# Patient Record
Sex: MALE | Race: WHITE | Employment: FULL TIME | URBAN - METROPOLITAN AREA
[De-identification: names, ages, dates, MRNs, and addresses within clinical notes are randomized per-mention and may not be internally consistent; named-entity substitution may affect disease eponyms.]

---

## 2017-12-11 PROBLEM — K35.80 ACUTE APPENDICITIS: Status: ACTIVE | Noted: 2017-12-11

## 2018-02-14 ENCOUNTER — OFFICE VISIT (OUTPATIENT)
Dept: ORTHOPEDIC SURGERY | Age: 38
End: 2018-02-14

## 2018-02-14 VITALS — WEIGHT: 260 LBS | BODY MASS INDEX: 33.37 KG/M2 | HEIGHT: 74 IN

## 2018-02-14 DIAGNOSIS — S43.52XA SPRAIN OF LEFT ACROMIOCLAVICULAR LIGAMENT, INITIAL ENCOUNTER: ICD-10-CM

## 2018-02-14 DIAGNOSIS — M25.512 LEFT SHOULDER PAIN, UNSPECIFIED CHRONICITY: Primary | ICD-10-CM

## 2018-02-14 PROCEDURE — 99203 OFFICE O/P NEW LOW 30 MIN: CPT | Performed by: ORTHOPAEDIC SURGERY

## 2018-02-14 RX ORDER — METHYLPREDNISOLONE 4 MG/1
TABLET ORAL
Qty: 1 KIT | Refills: 0 | Status: SHIPPED | OUTPATIENT
Start: 2018-02-14 | End: 2018-09-20 | Stop reason: ALTCHOICE

## 2018-02-14 NOTE — PROGRESS NOTES
Examination of the right lower extremity does not show any tenderness, deformity or injury. Range of motion is unremarkable. There is no gross instability. There are no rashes, ulcerations or lesions. Strength and tone are normal.    Radiology:     X-rays obtained and reviewed in office:  Views 4 views left shoulder demonstrates no evidence of fracture or dislocation other osseous abnormalities    Assessment :  Left shoulder a.c. joint sprain    Impression:  Encounter Diagnoses   Name Primary?  Left shoulder pain, unspecified chronicity Yes    Sprain of left acromioclavicular ligament, initial encounter        Office Procedures:  Orders Placed This Encounter   Procedures    XR SHOULDER LEFT (MIN 2 VIEWS)     [id number]     Order Specific Question:   Reason for exam:     Answer:   Pain       Treatment Plan:  I discussed diagnosis and prognosis with him today. Recommending at this time placement onto a Medrol Dosepak to reduce down his inflammation. Also recommend avoidance of any horizontal pressing activities which will exacerbate the a.c. joint.   If this is not improving over the next 4 weeks I will see him back in clinic

## 2018-06-13 ENCOUNTER — OFFICE VISIT (OUTPATIENT)
Dept: ORTHOPEDIC SURGERY | Age: 38
End: 2018-06-13

## 2018-06-13 VITALS — WEIGHT: 259.92 LBS | HEIGHT: 74 IN | BODY MASS INDEX: 33.36 KG/M2

## 2018-06-13 DIAGNOSIS — M19.019 AC JOINT ARTHROPATHY: Primary | ICD-10-CM

## 2018-06-13 PROCEDURE — 99213 OFFICE O/P EST LOW 20 MIN: CPT | Performed by: ORTHOPAEDIC SURGERY

## 2018-06-13 PROCEDURE — 20610 DRAIN/INJ JOINT/BURSA W/O US: CPT | Performed by: ORTHOPAEDIC SURGERY

## 2018-07-23 ENCOUNTER — OFFICE VISIT (OUTPATIENT)
Dept: ORTHOPEDIC SURGERY | Age: 38
End: 2018-07-23

## 2018-07-23 VITALS — WEIGHT: 259 LBS | BODY MASS INDEX: 33.24 KG/M2 | HEIGHT: 74 IN

## 2018-07-23 DIAGNOSIS — M19.019 AC JOINT ARTHROPATHY: Primary | ICD-10-CM

## 2018-07-23 PROCEDURE — 99213 OFFICE O/P EST LOW 20 MIN: CPT | Performed by: ORTHOPAEDIC SURGERY

## 2018-07-23 RX ORDER — NAPROXEN 500 MG/1
500 TABLET ORAL 2 TIMES DAILY WITH MEALS
Qty: 60 TABLET | Refills: 3 | Status: SHIPPED | OUTPATIENT
Start: 2018-07-23

## 2018-08-22 DIAGNOSIS — M25.512 LEFT SHOULDER PAIN, UNSPECIFIED CHRONICITY: Primary | ICD-10-CM

## 2018-08-23 ENCOUNTER — TELEPHONE (OUTPATIENT)
Dept: ORTHOPEDIC SURGERY | Age: 38
End: 2018-08-23

## 2018-09-04 ENCOUNTER — OFFICE VISIT (OUTPATIENT)
Dept: ORTHOPEDIC SURGERY | Age: 38
End: 2018-09-04

## 2018-09-04 VITALS — BODY MASS INDEX: 33.24 KG/M2 | HEIGHT: 74 IN | WEIGHT: 259 LBS

## 2018-09-04 DIAGNOSIS — M75.42 IMPINGEMENT SYNDROME OF LEFT SHOULDER: ICD-10-CM

## 2018-09-04 DIAGNOSIS — M19.019 AC JOINT ARTHROPATHY: Primary | ICD-10-CM

## 2018-09-04 PROCEDURE — 99213 OFFICE O/P EST LOW 20 MIN: CPT | Performed by: ORTHOPAEDIC SURGERY

## 2018-09-04 NOTE — PROGRESS NOTES
Chief Complaint    Results (MRI results)      History of Present Illness:  Claudette Jenkins is a 41 y.o. male. He is here for follow-up for his left shoulder. He does continue to have a lot of pain directly over his a.c. joint. We have treated him with intra-articular injection into the a.c. joint that has not given him any long-term improvement. He did have an MRI and is here today for the results. Medical History:  Patient's medications, allergies, past medical, surgical, social and family histories were reviewed and updated as appropriate. Review of Systems:  Pertinent items are noted in HPI  Review of systems reviewed from Patient History Form dated on 9/4/18 and available in the patient's chart under the Media tab. Vital Signs:  Ht 6' 2\" (1.88 m)   Wt 259 lb (117.5 kg)   BMI 33.25 kg/m²     General Exam:   Constitutional: Patient is adequately groomed with no evidence of malnutrition  DTRs: Deep tendon reflexes are intact  Mental Status: The patient is oriented to time, place and person. The patient's mood and affect are appropriate. Shoulder Examination:    Inspection:  No swelling erythema or ecchymosis noted about the left shoulder     Palpation:  no tenderness palpation today over the a.c. Joint. There is some crepitus over the a.c. Joint through shoulder range of motion     Range of Motion:  He does have full active range of motion of left shoulder.  There is some crepitus over the a.c. joint range of motion     Strength:   strength is intact with rotator cuff testing     Special Tests:  negative Candler's.     Skin: There are no rashes, ulcerations or lesions.     Gait: Walking with a normal gait    Additional Comments:       Additional Examinations:         Right Upper Extremity:  Examination of the right upper extremity does not show any tenderness, deformity or injury. Range of motion is unremarkable. There is no gross instability.   There are no rashes, ulcerations or

## 2018-09-18 ENCOUNTER — TELEPHONE (OUTPATIENT)
Dept: ORTHOPEDIC SURGERY | Age: 38
End: 2018-09-18

## 2018-09-26 ENCOUNTER — ANESTHESIA EVENT (OUTPATIENT)
Dept: OPERATING ROOM | Age: 38
End: 2018-09-26
Payer: COMMERCIAL

## 2018-09-27 ENCOUNTER — HOSPITAL ENCOUNTER (OUTPATIENT)
Age: 38
Setting detail: OUTPATIENT SURGERY
Discharge: HOME | End: 2018-09-27
Attending: ORTHOPAEDIC SURGERY | Admitting: ORTHOPAEDIC SURGERY
Payer: COMMERCIAL

## 2018-09-27 ENCOUNTER — ANESTHESIA (OUTPATIENT)
Dept: OPERATING ROOM | Age: 38
End: 2018-09-27
Payer: COMMERCIAL

## 2018-09-27 VITALS
OXYGEN SATURATION: 92 % | SYSTOLIC BLOOD PRESSURE: 128 MMHG | HEART RATE: 60 BPM | DIASTOLIC BLOOD PRESSURE: 80 MMHG | RESPIRATION RATE: 20 BRPM | HEIGHT: 74 IN | WEIGHT: 257 LBS | TEMPERATURE: 97.7 F | BODY MASS INDEX: 32.98 KG/M2

## 2018-09-27 VITALS
OXYGEN SATURATION: 88 % | TEMPERATURE: 96.1 F | RESPIRATION RATE: 10 BRPM | DIASTOLIC BLOOD PRESSURE: 73 MMHG | SYSTOLIC BLOOD PRESSURE: 110 MMHG

## 2018-09-27 DIAGNOSIS — M19.019 AC JOINT ARTHROPATHY: Primary | ICD-10-CM

## 2018-09-27 LAB
GLUCOSE BLD-MCNC: 122 MG/DL (ref 70–99)
PERFORMED ON: ABNORMAL

## 2018-09-27 PROCEDURE — 2500000003 HC RX 250 WO HCPCS: Performed by: ORTHOPAEDIC SURGERY

## 2018-09-27 PROCEDURE — 7100000000 HC PACU RECOVERY - FIRST 15 MIN: Performed by: ORTHOPAEDIC SURGERY

## 2018-09-27 PROCEDURE — 7100000011 HC PHASE II RECOVERY - ADDTL 15 MIN: Performed by: ORTHOPAEDIC SURGERY

## 2018-09-27 PROCEDURE — L3660 SO 8 AB RSTR CAN/WEB PRE OTS: HCPCS | Performed by: ORTHOPAEDIC SURGERY

## 2018-09-27 PROCEDURE — 2580000003 HC RX 258: Performed by: ORTHOPAEDIC SURGERY

## 2018-09-27 PROCEDURE — 7100000001 HC PACU RECOVERY - ADDTL 15 MIN: Performed by: ORTHOPAEDIC SURGERY

## 2018-09-27 PROCEDURE — 6360000002 HC RX W HCPCS: Performed by: NURSE ANESTHETIST, CERTIFIED REGISTERED

## 2018-09-27 PROCEDURE — 7100000010 HC PHASE II RECOVERY - FIRST 15 MIN: Performed by: ORTHOPAEDIC SURGERY

## 2018-09-27 PROCEDURE — 6360000002 HC RX W HCPCS: Performed by: ORTHOPAEDIC SURGERY

## 2018-09-27 PROCEDURE — 3700000000 HC ANESTHESIA ATTENDED CARE: Performed by: ORTHOPAEDIC SURGERY

## 2018-09-27 PROCEDURE — 3600000014 HC SURGERY LEVEL 4 ADDTL 15MIN: Performed by: ORTHOPAEDIC SURGERY

## 2018-09-27 PROCEDURE — 2720000010 HC SURG SUPPLY STERILE: Performed by: ORTHOPAEDIC SURGERY

## 2018-09-27 PROCEDURE — 3600000004 HC SURGERY LEVEL 4 BASE: Performed by: ORTHOPAEDIC SURGERY

## 2018-09-27 PROCEDURE — 64415 NJX AA&/STRD BRCH PLXS IMG: CPT | Performed by: ANESTHESIOLOGY

## 2018-09-27 PROCEDURE — 2500000003 HC RX 250 WO HCPCS: Performed by: NURSE ANESTHETIST, CERTIFIED REGISTERED

## 2018-09-27 PROCEDURE — 6360000002 HC RX W HCPCS: Performed by: ANESTHESIOLOGY

## 2018-09-27 PROCEDURE — 2709999900 HC NON-CHARGEABLE SUPPLY: Performed by: ORTHOPAEDIC SURGERY

## 2018-09-27 PROCEDURE — 3700000001 HC ADD 15 MINUTES (ANESTHESIA): Performed by: ORTHOPAEDIC SURGERY

## 2018-09-27 RX ORDER — HYDROMORPHONE HCL 110MG/55ML
0.5 PATIENT CONTROLLED ANALGESIA SYRINGE INTRAVENOUS EVERY 5 MIN PRN
Status: DISCONTINUED | OUTPATIENT
Start: 2018-09-27 | End: 2018-09-27 | Stop reason: HOSPADM

## 2018-09-27 RX ORDER — SODIUM CHLORIDE, SODIUM LACTATE, POTASSIUM CHLORIDE, CALCIUM CHLORIDE 600; 310; 30; 20 MG/100ML; MG/100ML; MG/100ML; MG/100ML
INJECTION, SOLUTION INTRAVENOUS CONTINUOUS
Status: DISCONTINUED | OUTPATIENT
Start: 2018-09-27 | End: 2018-09-27 | Stop reason: HOSPADM

## 2018-09-27 RX ORDER — LIDOCAINE HYDROCHLORIDE 10 MG/ML
1 INJECTION, SOLUTION EPIDURAL; INFILTRATION; INTRACAUDAL; PERINEURAL
Status: DISCONTINUED | OUTPATIENT
Start: 2018-09-27 | End: 2018-09-27 | Stop reason: HOSPADM

## 2018-09-27 RX ORDER — LIDOCAINE HYDROCHLORIDE 10 MG/ML
INJECTION, SOLUTION INFILTRATION; PERINEURAL
Status: COMPLETED | OUTPATIENT
Start: 2018-09-27 | End: 2018-09-27

## 2018-09-27 RX ORDER — LABETALOL HYDROCHLORIDE 5 MG/ML
5 INJECTION, SOLUTION INTRAVENOUS EVERY 10 MIN PRN
Status: DISCONTINUED | OUTPATIENT
Start: 2018-09-27 | End: 2018-09-27 | Stop reason: HOSPADM

## 2018-09-27 RX ORDER — SODIUM CHLORIDE 0.9 % (FLUSH) 0.9 %
10 SYRINGE (ML) INJECTION EVERY 12 HOURS SCHEDULED
Status: DISCONTINUED | OUTPATIENT
Start: 2018-09-27 | End: 2018-09-27 | Stop reason: HOSPADM

## 2018-09-27 RX ORDER — HYDRALAZINE HYDROCHLORIDE 20 MG/ML
5 INJECTION INTRAMUSCULAR; INTRAVENOUS EVERY 10 MIN PRN
Status: DISCONTINUED | OUTPATIENT
Start: 2018-09-27 | End: 2018-09-27 | Stop reason: HOSPADM

## 2018-09-27 RX ORDER — OXYCODONE HYDROCHLORIDE 5 MG/1
5 TABLET ORAL EVERY 4 HOURS PRN
Qty: 20 TABLET | Refills: 0 | Status: SHIPPED | OUTPATIENT
Start: 2018-09-27 | End: 2018-10-04

## 2018-09-27 RX ORDER — ROCURONIUM BROMIDE 10 MG/ML
INJECTION, SOLUTION INTRAVENOUS PRN
Status: DISCONTINUED | OUTPATIENT
Start: 2018-09-27 | End: 2018-09-27 | Stop reason: SDUPTHER

## 2018-09-27 RX ORDER — KETOROLAC TROMETHAMINE 30 MG/ML
INJECTION, SOLUTION INTRAMUSCULAR; INTRAVENOUS PRN
Status: DISCONTINUED | OUTPATIENT
Start: 2018-09-27 | End: 2018-09-27 | Stop reason: SDUPTHER

## 2018-09-27 RX ORDER — ONDANSETRON 2 MG/ML
4 INJECTION INTRAMUSCULAR; INTRAVENOUS
Status: DISCONTINUED | OUTPATIENT
Start: 2018-09-27 | End: 2018-09-27 | Stop reason: HOSPADM

## 2018-09-27 RX ORDER — IBUPROFEN 800 MG/1
800 TABLET ORAL EVERY 6 HOURS PRN
Qty: 60 TABLET | Refills: 3 | Status: SHIPPED | OUTPATIENT
Start: 2018-09-27

## 2018-09-27 RX ORDER — ONDANSETRON 2 MG/ML
INJECTION INTRAMUSCULAR; INTRAVENOUS PRN
Status: DISCONTINUED | OUTPATIENT
Start: 2018-09-27 | End: 2018-09-27 | Stop reason: SDUPTHER

## 2018-09-27 RX ORDER — SUCCINYLCHOLINE CHLORIDE 20 MG/ML
INJECTION INTRAMUSCULAR; INTRAVENOUS PRN
Status: DISCONTINUED | OUTPATIENT
Start: 2018-09-27 | End: 2018-09-27 | Stop reason: SDUPTHER

## 2018-09-27 RX ORDER — FENTANYL CITRATE 50 UG/ML
INJECTION, SOLUTION INTRAMUSCULAR; INTRAVENOUS PRN
Status: DISCONTINUED | OUTPATIENT
Start: 2018-09-27 | End: 2018-09-27 | Stop reason: SDUPTHER

## 2018-09-27 RX ORDER — FENTANYL CITRATE 50 UG/ML
100 INJECTION, SOLUTION INTRAMUSCULAR; INTRAVENOUS ONCE
Status: COMPLETED | OUTPATIENT
Start: 2018-09-27 | End: 2018-09-27

## 2018-09-27 RX ORDER — LIDOCAINE HYDROCHLORIDE 20 MG/ML
INJECTION, SOLUTION EPIDURAL; INFILTRATION; INTRACAUDAL; PERINEURAL PRN
Status: DISCONTINUED | OUTPATIENT
Start: 2018-09-27 | End: 2018-09-27 | Stop reason: SDUPTHER

## 2018-09-27 RX ORDER — OXYCODONE HYDROCHLORIDE AND ACETAMINOPHEN 5; 325 MG/1; MG/1
1 TABLET ORAL
Status: DISCONTINUED | OUTPATIENT
Start: 2018-09-27 | End: 2018-09-27 | Stop reason: HOSPADM

## 2018-09-27 RX ORDER — DEXAMETHASONE SODIUM PHOSPHATE 4 MG/ML
INJECTION, SOLUTION INTRA-ARTICULAR; INTRALESIONAL; INTRAMUSCULAR; INTRAVENOUS; SOFT TISSUE PRN
Status: DISCONTINUED | OUTPATIENT
Start: 2018-09-27 | End: 2018-09-27 | Stop reason: SDUPTHER

## 2018-09-27 RX ORDER — PROPOFOL 10 MG/ML
INJECTION, EMULSION INTRAVENOUS PRN
Status: DISCONTINUED | OUTPATIENT
Start: 2018-09-27 | End: 2018-09-27 | Stop reason: SDUPTHER

## 2018-09-27 RX ORDER — MIDAZOLAM HYDROCHLORIDE 1 MG/ML
2 INJECTION INTRAMUSCULAR; INTRAVENOUS ONCE
Status: COMPLETED | OUTPATIENT
Start: 2018-09-27 | End: 2018-09-27

## 2018-09-27 RX ORDER — BUPIVACAINE HYDROCHLORIDE AND EPINEPHRINE 5; 5 MG/ML; UG/ML
INJECTION, SOLUTION PERINEURAL
Status: COMPLETED | OUTPATIENT
Start: 2018-09-27 | End: 2018-09-27

## 2018-09-27 RX ORDER — CEFAZOLIN SODIUM 2 G/100ML
2 INJECTION, SOLUTION INTRAVENOUS
Status: COMPLETED | OUTPATIENT
Start: 2018-09-27 | End: 2018-09-27

## 2018-09-27 RX ORDER — LIDOCAINE HYDROCHLORIDE 10 MG/ML
0.5 INJECTION, SOLUTION EPIDURAL; INFILTRATION; INTRACAUDAL; PERINEURAL ONCE
Status: DISCONTINUED | OUTPATIENT
Start: 2018-09-27 | End: 2018-09-27 | Stop reason: HOSPADM

## 2018-09-27 RX ORDER — ACETAMINOPHEN 500 MG
1000 TABLET ORAL EVERY 6 HOURS PRN
Qty: 60 TABLET | Refills: 3 | Status: SHIPPED | OUTPATIENT
Start: 2018-09-27

## 2018-09-27 RX ORDER — SODIUM CHLORIDE 0.9 % (FLUSH) 0.9 %
10 SYRINGE (ML) INJECTION PRN
Status: DISCONTINUED | OUTPATIENT
Start: 2018-09-27 | End: 2018-09-27 | Stop reason: HOSPADM

## 2018-09-27 RX ORDER — M-VIT,TX,IRON,MINS/CALC/FOLIC 27MG-0.4MG
1 TABLET ORAL DAILY
COMMUNITY

## 2018-09-27 RX ADMIN — CEFAZOLIN SODIUM 2 G: 2 INJECTION, SOLUTION INTRAVENOUS at 07:06

## 2018-09-27 RX ADMIN — PROPOFOL 200 MG: 10 INJECTION, EMULSION INTRAVENOUS at 07:17

## 2018-09-27 RX ADMIN — MIDAZOLAM HYDROCHLORIDE 1 MG: 1 INJECTION, SOLUTION INTRAMUSCULAR; INTRAVENOUS at 06:57

## 2018-09-27 RX ADMIN — ROCURONIUM BROMIDE 10 MG: 10 INJECTION INTRAVENOUS at 07:15

## 2018-09-27 RX ADMIN — FENTANYL CITRATE 50 MCG: 50 INJECTION INTRAMUSCULAR; INTRAVENOUS at 06:57

## 2018-09-27 RX ADMIN — SUCCINYLCHOLINE CHLORIDE 160 MG: 20 INJECTION, SOLUTION INTRAMUSCULAR; INTRAVENOUS at 07:18

## 2018-09-27 RX ADMIN — PROPOFOL 40 MG: 10 INJECTION, EMULSION INTRAVENOUS at 07:19

## 2018-09-27 RX ADMIN — KETOROLAC TROMETHAMINE 30 MG: 30 INJECTION, SOLUTION INTRAMUSCULAR; INTRAVENOUS at 08:12

## 2018-09-27 RX ADMIN — FENTANYL CITRATE 50 MCG: 50 INJECTION, SOLUTION INTRAMUSCULAR; INTRAVENOUS at 07:31

## 2018-09-27 RX ADMIN — ONDANSETRON 4 MG: 2 INJECTION INTRAMUSCULAR; INTRAVENOUS at 07:23

## 2018-09-27 RX ADMIN — SODIUM CHLORIDE, POTASSIUM CHLORIDE, SODIUM LACTATE AND CALCIUM CHLORIDE: 600; 310; 30; 20 INJECTION, SOLUTION INTRAVENOUS at 06:36

## 2018-09-27 RX ADMIN — DEXAMETHASONE SODIUM PHOSPHATE 8 MG: 4 INJECTION, SOLUTION INTRAMUSCULAR; INTRAVENOUS at 07:23

## 2018-09-27 RX ADMIN — LIDOCAINE HYDROCHLORIDE 100 MG: 20 INJECTION, SOLUTION EPIDURAL; INFILTRATION; INTRACAUDAL; PERINEURAL at 07:17

## 2018-09-27 ASSESSMENT — PULMONARY FUNCTION TESTS
PIF_VALUE: 22
PIF_VALUE: 25
PIF_VALUE: 19
PIF_VALUE: 28
PIF_VALUE: 26
PIF_VALUE: 0
PIF_VALUE: 22
PIF_VALUE: 3
PIF_VALUE: 27
PIF_VALUE: 22
PIF_VALUE: 27
PIF_VALUE: 9
PIF_VALUE: 27
PIF_VALUE: 23
PIF_VALUE: 27
PIF_VALUE: 2
PIF_VALUE: 28
PIF_VALUE: 27
PIF_VALUE: 22
PIF_VALUE: 27
PIF_VALUE: 27
PIF_VALUE: 28
PIF_VALUE: 27
PIF_VALUE: 27
PIF_VALUE: 4
PIF_VALUE: 27
PIF_VALUE: 20
PIF_VALUE: 28
PIF_VALUE: 24
PIF_VALUE: 21
PIF_VALUE: 22
PIF_VALUE: 26
PIF_VALUE: 20
PIF_VALUE: 26
PIF_VALUE: 21
PIF_VALUE: 27
PIF_VALUE: 27
PIF_VALUE: 25
PIF_VALUE: 27
PIF_VALUE: 25
PIF_VALUE: 26
PIF_VALUE: 27
PIF_VALUE: 25
PIF_VALUE: 6
PIF_VALUE: 27
PIF_VALUE: 27
PIF_VALUE: 1
PIF_VALUE: 27
PIF_VALUE: 1
PIF_VALUE: 12
PIF_VALUE: 26
PIF_VALUE: 20
PIF_VALUE: 27
PIF_VALUE: 19
PIF_VALUE: 20
PIF_VALUE: 28
PIF_VALUE: 18
PIF_VALUE: 26
PIF_VALUE: 22
PIF_VALUE: 22
PIF_VALUE: 2
PIF_VALUE: 27
PIF_VALUE: 27
PIF_VALUE: 22
PIF_VALUE: 22

## 2018-09-27 ASSESSMENT — PAIN - FUNCTIONAL ASSESSMENT: PAIN_FUNCTIONAL_ASSESSMENT: 0-10

## 2018-09-27 ASSESSMENT — ENCOUNTER SYMPTOMS: SHORTNESS OF BREATH: 0

## 2018-09-27 NOTE — H&P
I have reviewed the history and physical and examined the patient and find no relevant changes. I have reviewed with the patient and/or family the risks, benefits, and alternatives to the procedure.     Brendon Petersen MD  9/27/2018

## 2018-09-27 NOTE — ANESTHESIA POSTPROCEDURE EVALUATION
Department of Anesthesiology  Postprocedure Note    Patient: Jose Hendrickson  MRN: [id number]  YOB: 1980  Date of evaluation: 9/27/2018  Time:  8:56 AM     Procedure Summary     Date:  09/27/18 Room / Location:  [facility] OR  / [facility] OR    Anesthesia Start:  3853 Anesthesia Stop:  0974    Procedure:  LEFT SHOULDER ARTHROSCOPY WITH SUBACROMIAL DECOMPRESSION AND DISTAL CLAVICLE EXCISION (Left ) Diagnosis:       (M19.19 AC JOINT ARTHROPATHY)      (M75.42 SHOULDER IMPINGEMENT)    Surgeon:  Fatimah Lewis MD Responsible Provider:  Kristen Dueñas MD    Anesthesia Type:  general ASA Status:  2          Anesthesia Type: general    Yoko Phase I: Yoko Score: 10    Yoko Phase II:      Last vitals: Reviewed and per EMR flowsheets.        Anesthesia Post Evaluation    Patient location during evaluation: PACU  Patient participation: complete - patient participated  Level of consciousness: awake and alert  Airway patency: patent  Nausea & Vomiting: no nausea and no vomiting  Complications: no  Cardiovascular status: hemodynamically stable  Respiratory status: acceptable  Hydration status: stable

## 2018-09-27 NOTE — ANESTHESIA PRE PROCEDURE
Department of Anesthesiology  Preprocedure Note       Name:  Britt Barger   Age:  41 y.o.  :  1980                                          MRN:  [id number]         Date:  2018      Surgeon: Ravinder Wong):  Dolores Hamilton MD    Procedure: Procedure(s):  LEFT SHOULDER ARTHROSCOPY WITH SUBACROMIAL DECOMPRESSION AND DISTAL CLAVICLE EXCISION    Medications prior to admission:   Prior to Admission medications    Medication Sig Start Date End Date Taking? Authorizing Provider   Multiple Vitamins-Minerals (THERAPEUTIC MULTIVITAMIN-MINERALS) tablet Take 1 tablet by mouth daily   Yes Historical Provider, MD   ALBUTEROL SULFATE IN Inhale into the lungs   Yes Historical Provider, MD   montelukast (SINGULAIR) 10 MG tablet Take 10 mg by mouth nightly   Yes Historical Provider, MD   lisinopril (PRINIVIL;ZESTRIL) 20 MG tablet Take 20 mg by mouth daily   Yes Historical Provider, MD   citalopram (CELEXA) 10 MG tablet Take 40 mg by mouth daily    Yes Historical Provider, MD   zolpidem (AMBIEN) 10 MG tablet Take  by mouth nightly as needed for Sleep. Yes Historical Provider, MD   naproxen (NAPROSYN) 500 MG tablet Take 1 tablet by mouth 2 times daily (with meals)  Patient taking differently: Take 500 mg by mouth as needed  18   Dolores Hamilton MD   ALPRAZolam Verneice Reagin) 0.5 MG tablet Take 0.5 mg by mouth nightly as needed for Sleep.     Historical Provider, MD       Current medications:    Current Facility-Administered Medications   Medication Dose Route Frequency Provider Last Rate Last Dose    ceFAZolin (ANCEF) 2 g in dextrose 4 % 100 mL IVPB (premix)  2 g Intravenous On Call to [address] [place] [address], MD        lactated ringers infusion   Intravenous Continuous Dolores Hamilton MD        lidocaine PF 1 % injection 0.5 mL  0.5 mL Intradermal Once Dolores Hamilton MD           Allergies:  No Known Allergies    Problem List:    Patient Active Problem List   Diagnosis Code    Acute appendicitis K35.80    AST 20 12/13/2017    ALT 28 12/13/2017       POC Tests: No results for input(s): POCGLU, POCNA, POCK, POCCL, POCBUN, POCHEMO, POCHCT in the last 72 hours. Coags: No results found for: PROTIME, INR, APTT    HCG (If Applicable): No results found for: PREGTESTUR, PREGSERUM, HCG, HCGQUANT     ABGs: No results found for: PHART, PO2ART, ODO8EPI, ZTM1OFS, BEART, O4QIGECO     Type & Screen (If Applicable):  No results found for: LABABO, [address]    Anesthesia Evaluation  Patient summary reviewed and Nursing notes reviewed no history of anesthetic complications:   Airway: Mallampati: I  TM distance: >3 FB   Neck ROM: full  Mouth opening: > = 3 FB Dental: normal exam         Pulmonary: breath sounds clear to auscultation  (+) sleep apnea:  asthma:     (-) shortness of breath                           Cardiovascular:  Exercise tolerance: good (>4 METS),   (+) hypertension:,     (-)  angina      Rhythm: regular  Rate: normal                    Neuro/Psych:               GI/Hepatic/Renal:        (-) GERD       Endo/Other:        (-) diabetes mellitus               Abdominal:           Vascular:                                      Anesthesia Plan      general and regional     ASA 2       Induction: intravenous. MIPS: Postoperative opioids intended and Prophylactic antiemetics administered. Anesthetic plan and risks discussed with patient. Plan discussed with CRNA. Plan for GETA with standard ASA monitoring. Additional monitoring and lines as dictated by intra-op course. Risks/benefits reviewed with patient and all anesthestic questions answered prior to procedure. NPO appropriate.       Darell Roth MD   9/27/2018

## 2018-10-01 DIAGNOSIS — M75.42 IMPINGEMENT SYNDROME OF LEFT SHOULDER: ICD-10-CM

## 2018-10-01 DIAGNOSIS — M19.012 OSTEOARTHRITIS OF LEFT SHOULDER, UNSPECIFIED OSTEOARTHRITIS TYPE: Primary | ICD-10-CM

## 2018-10-03 ENCOUNTER — OFFICE VISIT (OUTPATIENT)
Dept: ORTHOPEDIC SURGERY | Age: 38
End: 2018-10-03

## 2018-10-03 DIAGNOSIS — M75.42 IMPINGEMENT SYNDROME OF LEFT SHOULDER: Primary | ICD-10-CM

## 2018-10-03 PROCEDURE — 99024 POSTOP FOLLOW-UP VISIT: CPT | Performed by: ORTHOPAEDIC SURGERY

## 2018-10-04 ENCOUNTER — HOSPITAL ENCOUNTER (OUTPATIENT)
Dept: PHYSICAL THERAPY | Age: 38
Setting detail: THERAPIES SERIES
Discharge: HOME | End: 2018-10-04
Payer: COMMERCIAL

## 2018-10-04 PROCEDURE — 97140 MANUAL THERAPY 1/> REGIONS: CPT

## 2018-10-04 PROCEDURE — 97161 PT EVAL LOW COMPLEX 20 MIN: CPT

## 2018-10-04 PROCEDURE — 97110 THERAPEUTIC EXERCISES: CPT

## 2018-10-04 PROCEDURE — G8984 CARRY CURRENT STATUS: HCPCS

## 2018-10-04 PROCEDURE — G8985 CARRY GOAL STATUS: HCPCS

## 2018-10-04 NOTE — FLOWSHEET NOTE
Treatments:  PROM / STM / Oscillations-Mobs:  G-I, II, III, IV (PA's, Inf., Post.)  [x] (43044) Provided manual therapy to mobilize soft tissue/joints of cervical/CT, scapular GHJ and UE for the purpose of modulating pain, promoting relaxation,  increasing ROM, reducing/eliminating soft tissue swelling/inflammation/restriction, improving soft tissue extensibility and allowing for proper ROM for normal function with self care, reaching, carrying, lifting, house/yardwork, driving/computer work    Modalities:  Declined ice (pt will ice at home)    Charges:  Timed Code Treatment Minutes: 30   Total Treatment Minutes: 45     [x] EVAL  [x] ZB(03371) x  1   [] IONTO  [] NMR (16763) x      [] VASO  [x] Manual (08512) x  1    [] Other:  [] TA x       [] Mech Traction (64908)  [] ES(attended) (13884)      [] ES (un) (17732):     GOALS:  Patient stated goal: \"To get back into lifting and to get full range of motion in left shoulder\"      Therapist goals for Patient:   Short Term Goals: To be achieved in: 2 weeks  1. Independent in HEP and progression per patient tolerance, in order to prevent re-injury. 2. Patient will have a decrease in pain to facilitate improvement in movement, function, and ADLs as indicated by Functional Deficits.     Long Term Goals: To be achieved in: 6-8 weeks  1. Disability index score of 20% or less for the DASH to assist with reaching prior level of function. 2. Patient will demonstrate increased AROM to WNL for L shoulder to allow for proper joint functioning as indicated by patients Functional Deficits. 3. Patient will demonstrate an increase in Strength to 5/5 for L shoulder to allow for proper functional mobility as indicated by patients Functional Deficits. 4. Patient will return to reaching overhead without increased symptoms or restriction. 5. Patient will be able to run for 1 mile without increased symptoms or restriction in L shoulder.        Progression Towards Functional

## 2018-10-09 ENCOUNTER — HOSPITAL ENCOUNTER (OUTPATIENT)
Dept: PHYSICAL THERAPY | Age: 38
Setting detail: THERAPIES SERIES
Discharge: HOME | End: 2018-10-09
Payer: COMMERCIAL

## 2018-10-09 PROCEDURE — 97140 MANUAL THERAPY 1/> REGIONS: CPT | Performed by: SPECIALIST/TECHNOLOGIST

## 2018-10-09 PROCEDURE — 97110 THERAPEUTIC EXERCISES: CPT | Performed by: SPECIALIST/TECHNOLOGIST

## 2018-10-09 NOTE — FLOWSHEET NOTE
Manual Intervention       Shld /GH Mobs       Post Cap mobs       Thoracic/Rib manipualtion       CT MT/Mobs       PROM MT  10'  [address] Mobs grade 1/2                 NMR re-education       T-spine Ext       GH depres/compress       Francesca Scap Bio       Scap/GH NMR       Body blade ER/IR    NPV    Wall ball roll       Wall Ball bounce                  Therapeutic Exercise and NMR EXR  [x] (56688) Provided verbal/tactile cueing for activities related to strengthening, flexibility, endurance, ROM  for improvements in scapular, scapulothoracic and UE control with self care, reaching, carrying, lifting, house/yardwork, driving/computer work.    [] (61467) Provided verbal/tactile cueing for activities related to improving balance, coordination, kinesthetic sense, posture, motor skill, proprioception  to assist with  scapular, scapulothoracic and UE control with self care, reaching, carrying, lifting, house/yardwork, driving/computer work. Therapeutic Activities:    [] (97618 or 45586) Provided verbal/tactile cueing for activities related to improving balance, coordination, kinesthetic sense, posture, motor skill, proprioception and motor activation to allow for proper function of scapular, scapulothoracic and UE control with self care, carrying, lifting, driving/computer work.      Home Exercise Program:    [x] (78741) Reviewed/Progressed HEP activities related to strengthening, flexibility, endurance, ROM of scapular, scapulothoracic and UE control with self care, reaching, carrying, lifting, house/yardwork, driving/computer work  [] (49097) Reviewed/Progressed HEP activities related to improving balance, coordination, kinesthetic sense, posture, motor skill, proprioception of scapular, scapulothoracic and UE control with self care, reaching, carrying, lifting, house/yardwork, driving/computer work      Manual Treatments:  PROM / STM / Oscillations-Mobs:  G-I, II, III, IV (PA's, Inf., Post.)  [x]

## 2018-10-11 ENCOUNTER — HOSPITAL ENCOUNTER (OUTPATIENT)
Dept: PHYSICAL THERAPY | Age: 38
Setting detail: THERAPIES SERIES
Discharge: HOME | End: 2018-10-11
Payer: COMMERCIAL

## 2018-10-11 PROCEDURE — 97112 NEUROMUSCULAR REEDUCATION: CPT | Performed by: SPECIALIST/TECHNOLOGIST

## 2018-10-11 PROCEDURE — 97110 THERAPEUTIC EXERCISES: CPT | Performed by: SPECIALIST/TECHNOLOGIST

## 2018-10-11 PROCEDURE — 97140 MANUAL THERAPY 1/> REGIONS: CPT | Performed by: SPECIALIST/TECHNOLOGIST

## 2018-10-11 NOTE — FLOWSHEET NOTE
[facility]    Physical Therapy Daily Treatment Note  Date:  10/11/2018    Patient Name:  Sonu Ceron    :  1980  MRN: [id number]  Restrictions/Precautions:    Medical/Treatment Diagnosis Information:  · Diagnosis: Osteoarthritis of left shoulder (M19.012), impingement syndrome of left shoulder (M75.42); s/p L shoulder arthroscopy, SAD, DCE 18  · Treatment Diagnosis: Left shoulder pain   Insurance/Certification information:  PT Insurance Information: [employer] - $500 deductible, $2500 OOP max, $0 co-pay, 80%/20% co-insurance, 60 PT visits per calendar year  Physician Information:  Referring Practitioner: Dr. Bess Davila of care signed (Y/N):     Date of Patient follow up with Physician: Germaine Bach 10/31    G-Code (if applicable):      Date G-Code Applied:         Progress Note: [x]  Yes  []  No  Next due by: Visit #10      Latex Allergy:  [x]NO      []YES  Preferred Language for Healthcare:   [x]English       []other:    Visit # Insurance Allowable   3 60     Pain level:  3/10 Worst 4/5    SUBJECTIVE:  Shoulder doing well has some soreness. Shoulder feels better every day.      OBJECTIVE:   Observation:   Test measurements:    10/9/18 PROM progressing well with end range stiffness in all planes especially with IR/ER   10/11/18 +moderate  Ecchymosis across the anterior arm/bicep    RESTRICTIONS/PRECAUTIONS: L shoulder arthroscopy, SAD, DCE 18    Exercises/Interventions:  45'  Therapeutic Ex Wt / Resistance Sets/sec Reps Notes   Pulleys flex/abd   6'    Cane AAROM flex  10\" 10x    3 way Isomet  Flex, IR, ER   T- band Row/EXT Blue  3 10    T- band lower pinch       T- band ER activation  IR  RED   Green 2 10xea    Supine SA punch Wt NPV 2 10x    SL ER 1#  3 10x    Prone Rows//ext 2# 2 10x    Wall slides flex HEP 5\" 15    Seated HH  Depression       No Money Red 2 10x    Standing flex/scap       Standing Punch       Doorway

## 2018-10-16 ENCOUNTER — HOSPITAL ENCOUNTER (OUTPATIENT)
Dept: PHYSICAL THERAPY | Age: 38
Setting detail: THERAPIES SERIES
Discharge: HOME | End: 2018-10-16
Payer: COMMERCIAL

## 2018-10-16 PROCEDURE — 97110 THERAPEUTIC EXERCISES: CPT | Performed by: SPECIALIST/TECHNOLOGIST

## 2018-10-16 PROCEDURE — 97140 MANUAL THERAPY 1/> REGIONS: CPT | Performed by: SPECIALIST/TECHNOLOGIST

## 2018-10-16 PROCEDURE — 97112 NEUROMUSCULAR REEDUCATION: CPT | Performed by: SPECIALIST/TECHNOLOGIST

## 2018-10-16 NOTE — FLOWSHEET NOTE
[address]    Physical Therapy Daily Treatment Note  Date:  10/16/2018    Patient Name:  Deric Rose    :  1980  MRN: [id number]  Restrictions/Precautions:    Medical/Treatment Diagnosis Information:  · Diagnosis: Osteoarthritis of left shoulder (M19.012), impingement syndrome of left shoulder (M75.42); s/p L shoulder arthroscopy, SAD, DCE 18  · Treatment Diagnosis: Left shoulder pain   Insurance/Certification information:  PT Insurance Information: [employer] - $500 deductible, $2500 OOP max, $0 co-pay, 80%/20% co-insurance, 60 PT visits per calendar year  Physician Information:  Referring Practitioner: Dr. Jakob Ashby of care signed (Y/N):     Date of Patient follow up with Physician: Davonte Huff 10/31    G-Code (if applicable):      Date G-Code Applied:         Progress Note: [x]  Yes  []  No  Next due by: Visit #10      Latex Allergy:  [x]NO      []YES  Preferred Language for Healthcare:   [x]English       []other:    Visit # Insurance Allowable   4 60     Pain level:  2/10 Worst 4/5    SUBJECTIVE:  LT. Shoulder doing well, denies any issues. Noticed some increased pain after waking up and felt maybe he had slept on it wrong but icing helped.   [place]  OBJECTIVE:    Observation:   Test measurements:    10/9/18 PROM progressing well with end range stiffness in all planes especially with IR/ER   10/11/18 +moderate  Ecchymosis across the anterior arm/bicep  10/16/18  Moderate Bscapular winging with wall pushups    RESTRICTIONS/PRECAUTIONS: L shoulder arthroscopy, SAD, DCE 18    Exercises/Interventions:  45'  Therapeutic Ex Wt / Resistance Sets/sec Reps Notes   Pulleys flex/abd   6'    Cane AAROM flex  10\" 10x    3 way Isomet  Flex, IR, ER   T- band Row/EXT  High Rows Blue  3 10ea    T- band lower pinch       T- band ER activation  IR    standing flexion RED   Green  Red 3 10xea    Supine SA punch 2#  3 10x    SL ER sense, posture, motor skill, proprioception of scapular, scapulothoracic and UE control with self care, reaching, carrying, lifting, house/yardwork, driving/computer work      Manual Treatments:  PROM / STM / Oscillations-Mobs:  G-I, II, III, IV (PA's, Inf., Post.)  [x] (92660) Provided manual therapy to mobilize soft tissue/joints of cervical/CT, scapular GHJ and UE for the purpose of modulating pain, promoting relaxation,  increasing ROM, reducing/eliminating soft tissue swelling/inflammation/restriction, improving soft tissue extensibility and allowing for proper ROM for normal function with self care, reaching, carrying, lifting, house/yardwork, driving/computer work    Modalities:  Declined ice (pt will ice at home)    Charges:  Timed Code Treatment Minutes: 55   Total Treatment Minutes: 55     [] EVAL  [x] EE(35257) x  2   [] IONTO  [x] NMR (85183) x  1   [] VASO  [x] Manual (43263) x  1    [] Other:  [] TA x       [] Mech Traction (58090)  [] ES(attended) (66747)      [] ES (un) (79031):     GOALS:  Patient stated goal: \"To get back into lifting and to get full range of motion in left shoulder\"      Therapist goals for Patient:   Short Term Goals: To be achieved in: 2 weeks  1. Independent in HEP and progression per patient tolerance, in order to prevent re-injury. 2. Patient will have a decrease in pain to facilitate improvement in movement, function, and ADLs as indicated by Functional Deficits.     Long Term Goals: To be achieved in: 6-8 weeks  1. Disability index score of 20% or less for the DASH to assist with reaching prior level of function. 2. Patient will demonstrate increased AROM to WNL for L shoulder to allow for proper joint functioning as indicated by patients Functional Deficits. 3. Patient will demonstrate an increase in Strength to 5/5 for L shoulder to allow for proper functional mobility as indicated by patients Functional Deficits.    4. Patient will return to reaching overhead

## 2018-10-19 ENCOUNTER — HOSPITAL ENCOUNTER (OUTPATIENT)
Dept: PHYSICAL THERAPY | Age: 38
Setting detail: THERAPIES SERIES
Discharge: HOME | End: 2018-10-19
Payer: COMMERCIAL

## 2018-10-19 PROCEDURE — 97112 NEUROMUSCULAR REEDUCATION: CPT | Performed by: SPECIALIST/TECHNOLOGIST

## 2018-10-19 PROCEDURE — 97140 MANUAL THERAPY 1/> REGIONS: CPT | Performed by: SPECIALIST/TECHNOLOGIST

## 2018-10-19 PROCEDURE — 97110 THERAPEUTIC EXERCISES: CPT | Performed by: SPECIALIST/TECHNOLOGIST

## 2018-10-19 NOTE — FLOWSHEET NOTE
[facility]    Physical Therapy Daily Treatment Note  Date:  10/19/2018    Patient Name:  Teodora Padilla    :  1980  MRN: [id number]  Restrictions/Precautions:    Medical/Treatment Diagnosis Information:  · Diagnosis: Osteoarthritis of left shoulder (M19.012), impingement syndrome of left shoulder (M75.42); s/p L shoulder arthroscopy, SAD, DCE 18  · Treatment Diagnosis: Left shoulder pain   Insurance/Certification information:  PT Insurance Information: [place] - $500 deductible, $2500 OOP max, $0 co-pay, 80%/20% co-insurance, 60 PT visits per calendar year  Physician Information:  Referring Practitioner: Dr. Adelia Thao of care signed (Y/N):     Date of Patient follow up with Physician: Arias Bangura 10/31    G-Code (if applicable):      Date G-Code Applied:         Progress Note: [x]  Yes  []  No  Next due by: Visit #10      Latex Allergy:  [x]NO      []YES  Preferred Language for Healthcare:   [x]English       []other:    Visit # Insurance Allowable   5 60     Pain level:  2/10 Worst 4/5    SUBJECTIVE:  Shoulder doing well, bu had increased soreness and pain and has taken a break since last session. Woke up today and feels fine. Workout sore with muscles and denies any ac joint pain. Had good workout on Monday.    OBJECTIVE:    Observation:   Test measurements:    10/9/18 PROM progressing well with end range stiffness in all planes especially with IR/ER   10/11/18 +moderate  Ecchymosis across the anterior arm/bicep  10/16/18  Moderate Bscapular winging with wall pushups    RESTRICTIONS/PRECAUTIONS: L shoulder arthroscopy, SAD, DCE 18    Exercises/Interventions:  45'  Therapeutic Ex Wt / Resistance Sets/sec Reps Notes   Pulleys flex/abd   6'    Cane AAROM flex  10\" 10x    3 way Isomet  Flex, IR, ER   T- band Row/EXT  High Rows Blue  Green  3 10ea           T- band ER activation  IR    standing flexion Green  Red 3 10xea HEP activities related to improving balance, coordination, kinesthetic sense, posture, motor skill, proprioception of scapular, scapulothoracic and UE control with self care, reaching, carrying, lifting, house/yardwork, driving/computer work      Manual Treatments:  PROM / STM / Oscillations-Mobs:  G-I, II, III, IV (PA's, Inf., Post.)  [x] (20391) Provided manual therapy to mobilize soft tissue/joints of cervical/CT, scapular GHJ and UE for the purpose of modulating pain, promoting relaxation,  increasing ROM, reducing/eliminating soft tissue swelling/inflammation/restriction, improving soft tissue extensibility and allowing for proper ROM for normal function with self care, reaching, carrying, lifting, house/yardwork, driving/computer work    Modalities:  Declined ice (pt will ice at home)    Charges:  Timed Code Treatment Minutes: 55   Total Treatment Minutes: 55     [] EVAL  [x] IU(63335) x  2   [] IONTO  [x] NMR (84883) x  1   [] VASO  [x] Manual (91086) x  1    [] Other:  [] TA x       [] Mech Traction (00041)  [] ES(attended) (85168)      [] ES (un) (47706):     GOALS:  Patient stated goal: \"To get back into lifting and to get full range of motion in left shoulder\"      Therapist goals for Patient:   Short Term Goals: To be achieved in: 2 weeks  1. Independent in HEP and progression per patient tolerance, in order to prevent re-injury. 2. Patient will have a decrease in pain to facilitate improvement in movement, function, and ADLs as indicated by Functional Deficits.     Long Term Goals: To be achieved in: 6-8 weeks  1. Disability index score of 20% or less for the DASH to assist with reaching prior level of function. 2. Patient will demonstrate increased AROM to WNL for L shoulder to allow for proper joint functioning as indicated by patients Functional Deficits.    3. Patient will demonstrate an increase in Strength to 5/5 for L shoulder to allow for proper functional mobility as indicated by patients

## 2018-10-23 ENCOUNTER — HOSPITAL ENCOUNTER (OUTPATIENT)
Dept: PHYSICAL THERAPY | Age: 38
Setting detail: THERAPIES SERIES
Discharge: HOME | End: 2018-10-23
Payer: COMMERCIAL

## 2018-10-23 PROCEDURE — 97110 THERAPEUTIC EXERCISES: CPT | Performed by: SPECIALIST/TECHNOLOGIST

## 2018-10-23 PROCEDURE — 97112 NEUROMUSCULAR REEDUCATION: CPT | Performed by: SPECIALIST/TECHNOLOGIST

## 2018-10-23 PROCEDURE — 97140 MANUAL THERAPY 1/> REGIONS: CPT | Performed by: SPECIALIST/TECHNOLOGIST

## 2018-10-23 NOTE — FLOWSHEET NOTE
[facility]    Physical Therapy Daily Treatment Note  Date:  10/23/2018    Patient Name:  Layne Luna    :  1980  MRN: [id number]  Restrictions/Precautions:    Medical/Treatment Diagnosis Information:  · Diagnosis: Osteoarthritis of left shoulder (M19.012), impingement syndrome of left shoulder (M75.42); s/p L shoulder arthroscopy, SAD, DCE 18  · Treatment Diagnosis: Left shoulder pain   Insurance/Certification information:  PT Insurance Information: [place] - $500 deductible, $2500 OOP max, $0 co-pay, 80%/20% co-insurance, 60 PT visits per calendar year  Physician Information:  Referring Practitioner: Dr. Andreina Lomas of care signed (Y/N):     Date of Patient follow up with Physician: Arsen Crawford 10/31    G-Code (if applicable):      Date G-Code Applied:         Progress Note: [x]  Yes  []  No  Next due by: Visit #10      Latex Allergy:  [x]NO      []YES  Preferred Language for Healthcare:   [x]English       []other:    Visit # Insurance Allowable   6 60     Pain level:  2/10 Worst 4/5    SUBJECTIVE: 3 s/p Pt reports shoulder doing well.  Able to resume workouts at home without any issues   OBJECTIVE:    Observation:   Test measurements:    10/9/18 PROM progressing well with end range stiffness in all planes especially with IR/ER   10/11/18 +moderate  Ecchymosis across the anterior arm/bicep  10/16/18  Moderate Bscapular winging with wall pushups    RESTRICTIONS/PRECAUTIONS: L shoulder arthroscopy, SAD, DCE 18    Exercises/Interventions:  45'  Therapeutic Ex Wt / Resistance Sets/sec Reps Notes   Pulleys flex/abd   6'    Cane AAROM flex  10\" 10x    3 way Isomet     T- band Row/EXT  High Rows Black    3 10ea           T- band ER activation  IR    standing flexion Green  Blue      3 10xea    Supine SA punch 5#  3 10x    SL ER/ SL punch 3#  3 10x    Prone Rows/EXT  HAB/ scaption   5#  2# 3 10x    Wall slides flex HEP 5\" 15 No Money Green  3 10x     Bicep   Tricep Ok to start @ home   Curls/ tircep kickbacks/ straight bar   Standing scaptions 2# 3 10x    Doorway ER stretch  ERLLLD  10\" 10x                                Manual Intervention       Shld /GH Mobs       Post Cap mobs       Thoracic/Rib manipualtion       CT MT/Mobs       PROM MT  10'  [address] Mobs grade 1/2                 NMR re-education       T-spine Ext       [address] depres/compress       Rhythmic stabs 2# 10\" 5x    Scap/GH NMR       Body blade ER/IR   Flex Yellow 15-20\" 3x    Waterstick Abd 3# 15\" 3x    Wall Ball bounce       pushups/ counter  2 10x        Therapeutic Exercise and NMR EXR  [x] (70852) Provided verbal/tactile cueing for activities related to strengthening, flexibility, endurance, ROM  for improvements in scapular, scapulothoracic and UE control with self care, reaching, carrying, lifting, house/yardwork, driving/computer work.    [] (11518) Provided verbal/tactile cueing for activities related to improving balance, coordination, kinesthetic sense, posture, motor skill, proprioception  to assist with  scapular, scapulothoracic and UE control with self care, reaching, carrying, lifting, house/yardwork, driving/computer work. Therapeutic Activities:    [] (05104 or 78137) Provided verbal/tactile cueing for activities related to improving balance, coordination, kinesthetic sense, posture, motor skill, proprioception and motor activation to allow for proper function of scapular, scapulothoracic and UE control with self care, carrying, lifting, driving/computer work.      Home Exercise Program:    [x] (01451) Reviewed/Progressed HEP activities related to strengthening, flexibility, endurance, ROM of scapular, scapulothoracic and UE control with self care, reaching, carrying, lifting, house/yardwork, driving/computer work  [] (42847) Reviewed/Progressed HEP activities related to improving balance, coordination, kinesthetic sense, posture, motor skill, proprioception of scapular, scapulothoracic and UE control with self care, reaching, carrying, lifting, house/yardwork, driving/computer work      Manual Treatments:  PROM / STM / Oscillations-Mobs:  G-I, II, III, IV (Clair, Inf., Post.)  [x] (52941) Provided manual therapy to mobilize soft tissue/joints of cervical/CT, scapular GHJ and UE for the purpose of modulating pain, promoting relaxation,  increasing ROM, reducing/eliminating soft tissue swelling/inflammation/restriction, improving soft tissue extensibility and allowing for proper ROM for normal function with self care, reaching, carrying, lifting, house/yardwork, driving/computer work    Modalities:  Declined ice (pt will ice at home)    Charges:  Timed Code Treatment Minutes: 55   Total Treatment Minutes: 55     [] EVAL  [x] YM(39774) x  2   [] IONTO  [x] NMR (15601) x  1   [] VASO  [x] Manual (23810) x  1    [] Other:  [] TA x       [] Mech Traction (29853)  [] ES(attended) (31102)      [] ES (un) (60779):     GOALS:  Patient stated goal: \"To get back into lifting and to get full range of motion in left shoulder\"      Therapist goals for Patient:   Short Term Goals: To be achieved in: 2 weeks  1. Independent in HEP and progression per patient tolerance, in order to prevent re-injury. 2. Patient will have a decrease in pain to facilitate improvement in movement, function, and ADLs as indicated by Functional Deficits.     Long Term Goals: To be achieved in: 6-8 weeks  1. Disability index score of 20% or less for the DASH to assist with reaching prior level of function. 2. Patient will demonstrate increased AROM to WNL for L shoulder to allow for proper joint functioning as indicated by patients Functional Deficits. 3. Patient will demonstrate an increase in Strength to 5/5 for L shoulder to allow for proper functional mobility as indicated by patients Functional Deficits.    4. Patient will return to reaching overhead without increased symptoms or

## 2018-10-26 ENCOUNTER — HOSPITAL ENCOUNTER (OUTPATIENT)
Dept: PHYSICAL THERAPY | Age: 38
Setting detail: THERAPIES SERIES
Discharge: HOME | End: 2018-10-26
Payer: COMMERCIAL

## 2018-10-26 PROCEDURE — 97110 THERAPEUTIC EXERCISES: CPT | Performed by: SPECIALIST/TECHNOLOGIST

## 2018-10-26 PROCEDURE — 97112 NEUROMUSCULAR REEDUCATION: CPT | Performed by: SPECIALIST/TECHNOLOGIST

## 2018-10-26 PROCEDURE — 97140 MANUAL THERAPY 1/> REGIONS: CPT | Performed by: SPECIALIST/TECHNOLOGIST

## 2018-10-26 NOTE — FLOWSHEET NOTE
scaption     3# 3 10xea    Wall slides flex HEP 5\" 15           No Money   HABD Green  3 10xea     CC Bicep  B   Tricep  B  30#  20# 3 10x Curls/ tircep kickbacks/ straight bar   Standing scaptions 3# 3 10x    Doorway ER stretch  ERLLLD  10\" 10x    CC ROWS/LPD    Ext    ER    IR  45#  15#  5#  10# 3 10x    IR sleeper  30\" 3x                  Manual Intervention       Shld /GH Mobs       Post Cap mobs       Thoracic/Rib manipualtion       CT MT/Mobs       PROM MT  10'  [address] Mobs grade 1/2                 NMR re-education       T-spine Ext       GH depres/compress       Rhythmic stabs 2# 10\" 5x    Ball drops  21 ounces  1 30x flexion   Body blade ER/IR     Flex black  Yellow 20\" 3x    Waterstick Abd 3# 15\" 3x 8 # flex NPV   Wall Ball bounce       pushups/ counter  2 10x        Therapeutic Exercise and NMR EXR  [x] (72177) Provided verbal/tactile cueing for activities related to strengthening, flexibility, endurance, ROM  for improvements in scapular, scapulothoracic and UE control with self care, reaching, carrying, lifting, house/yardwork, driving/computer work.    [] (45971) Provided verbal/tactile cueing for activities related to improving balance, coordination, kinesthetic sense, posture, motor skill, proprioception  to assist with  scapular, scapulothoracic and UE control with self care, reaching, carrying, lifting, house/yardwork, driving/computer work. Therapeutic Activities:    [] (29266 or 89757) Provided verbal/tactile cueing for activities related to improving balance, coordination, kinesthetic sense, posture, motor skill, proprioception and motor activation to allow for proper function of scapular, scapulothoracic and UE control with self care, carrying, lifting, driving/computer work.      Home Exercise Program:    [x] (01884) Reviewed/Progressed HEP activities related to strengthening, flexibility, endurance, ROM of scapular, scapulothoracic and UE control with self care, reaching, carrying, lifting, for L shoulder to allow for proper functional mobility as indicated by patients Functional Deficits. 4. Patient will return to reaching overhead without increased symptoms or restriction. MET   5. Patient will be able to run for 1 mile without increased symptoms or restriction in L shoulder. MET    Progression Towards Functional goals:  [] Patient is progressing as expected towards functional goals listed. [] Progression is slowed due to complexities listed. [] Progression has been slowed due to co-morbidities. [x] Plan just implemented, too soon to assess goals progression  [] Other:     ASSESSMENT:  Pt tolerated progressions well today, with addressing ROM and strength with TB/PRES. Had some lactic acid burning with OH progressions but denies any pain from progressions. Increased stiffness with performing IR w/ CC. Treatment/Activity Tolerance:  [x] Patient tolerated treatment well [x] Patient limited by fatique  [] Patient limited by pain  [] Patient limited by other medical complications  [] Other:     Prognosis: [x] Good [] Fair  [] Poor    Patient Requires Follow-up: [x] Yes  [] No    PLAN: Progress strength/ROM as able. Ok to start doing more at the gym to progress strength. Increased focus on posterior shoulder and avoiding trap compensation.   1x week for PT session and increase intensity with gym workouts and IR stretching  [x] Continue per plan of care [] Alter current plan (see comments)  [] Plan of care initiated [] Hold pending MD visit [] Discharge    Electronically signed by: Supriya Cheatham [facility], [id number]

## 2018-10-31 ENCOUNTER — HOSPITAL ENCOUNTER (OUTPATIENT)
Dept: PHYSICAL THERAPY | Age: 38
Setting detail: THERAPIES SERIES
Discharge: HOME | End: 2018-10-31
Payer: COMMERCIAL

## 2018-10-31 ENCOUNTER — OFFICE VISIT (OUTPATIENT)
Dept: ORTHOPEDIC SURGERY | Age: 38
End: 2018-10-31

## 2018-10-31 VITALS — BODY MASS INDEX: 32.99 KG/M2 | HEIGHT: 74 IN | WEIGHT: 257.06 LBS

## 2018-10-31 DIAGNOSIS — M75.42 IMPINGEMENT SYNDROME OF LEFT SHOULDER: Primary | ICD-10-CM

## 2018-10-31 PROCEDURE — 97140 MANUAL THERAPY 1/> REGIONS: CPT | Performed by: SPECIALIST/TECHNOLOGIST

## 2018-10-31 PROCEDURE — 97112 NEUROMUSCULAR REEDUCATION: CPT | Performed by: SPECIALIST/TECHNOLOGIST

## 2018-10-31 PROCEDURE — 97110 THERAPEUTIC EXERCISES: CPT | Performed by: SPECIALIST/TECHNOLOGIST

## 2018-10-31 PROCEDURE — 99024 POSTOP FOLLOW-UP VISIT: CPT | Performed by: ORTHOPAEDIC SURGERY

## 2018-10-31 NOTE — PROGRESS NOTES
Chief Complaint    Follow-up (left shoulder)      History of Present Illness:  Magy Orta is a 41 y.o. male. Follow-up for left shoulder. Status post left shoulder arthroscopy subacromial decompression and distal clavicle excision. He is doing very well at this time. Denies having any significant pain within the shoulder but does have some discomfort in certain positions with movement but is getting progressively better. Making good progress of physical therapy. Medical History:  Patient's medications, allergies, past medical, surgical, social and family histories were reviewed and updated as appropriate. Review of Systems:  Pertinent items are noted in HPI  Review of systems reviewed from Patient History Form dated on 10/31/18 and available in the patient's chart under the Media tab. Vital Signs:  Ht 6' 2.02\" (1.88 m)   Wt 257 lb 0.9 oz (116.6 kg)   BMI 32.99 kg/m²     General Exam:   Constitutional: Patient is adequately groomed with no evidence of malnutrition  DTRs: Deep tendon reflexes are intact  Mental Status: The patient is oriented to time, place and person. The patient's mood and affect are appropriate. Shoulder Examination:    Inspection:  Portal sites are well-healed. No erythema or drainage    Palpation:  No tenderness palpation over the a.c. joint or bicipital groove    Range of Motion:  Full active range of motion    Strength:  Does have good strength with rotator cuff testing    Special Tests:  Negative drop arm exam.  Negative Meagher's. Negative impingement    Skin: There are no rashes, ulcerations or lesions. Additional Comments:       Additional Examinations:         Right Upper Extremity:  Examination of the right upper extremity does not show any tenderness, deformity or injury. Range of motion is unremarkable. There is no gross instability. There are no rashes, ulcerations or lesions.   Strength and tone are normal.        Assessment :  Status post left

## 2018-11-09 ENCOUNTER — HOSPITAL ENCOUNTER (OUTPATIENT)
Dept: PHYSICAL THERAPY | Age: 38
Setting detail: THERAPIES SERIES
Discharge: HOME | End: 2018-11-09
Payer: COMMERCIAL

## 2018-11-09 PROCEDURE — 97110 THERAPEUTIC EXERCISES: CPT | Performed by: SPECIALIST/TECHNOLOGIST

## 2018-11-09 PROCEDURE — 97112 NEUROMUSCULAR REEDUCATION: CPT | Performed by: SPECIALIST/TECHNOLOGIST

## 2018-11-09 PROCEDURE — 97140 MANUAL THERAPY 1/> REGIONS: CPT | Performed by: SPECIALIST/TECHNOLOGIST

## 2018-11-09 NOTE — FLOWSHEET NOTE
band Row/EXT  High Rows           T- band ER activation  IR    standing flexion   Blue        3   10xea    Supine SA punch 10#  3 10x    SL ER/ SL punch  SL ABDuction 6#ea  3 10x    Prone Rows/EXT  HAB/ scaption     5# 3 10xea    Wall slides flex  10\" 10X           No Money   HABD     CC Bicep  B   Tricep  B  40#  15 SA# 3 10x Curls/ tircep kickbacks/ straight bar   Standing scaptions 3# 3 10x    Doorway ER stretch  ERLLLD  10\" 10x    CC ROWS/LPD    Ext    ER    IR  60#/ 60#  15#  5#  15# 3 10x    IR sleeper  30\" 3x                  Manual Intervention       Shld /GH Mobs       Post Cap mobs       Thoracic/Rib manipualtion       CT MT/Mobs       PROM MT  10'  [address] Mobs grade 1/2                 NMR re-education       plyoback toss  CP/OH 6# 2x20     D2 flex  & flex Green 25x     Rhythmic stabs 2# 10\" 5x    Ball drops  2#  1 30x flexion   Body blade ER/IR  90/90    Flex Yellow  Black 30\"  20\" 3x    Waterstick Abd  flex 3#  8# 15\" 3x 8 # flex NPV    army wall crawls wine 2 10x    pushups/ table  2 10x        Therapeutic Exercise and NMR EXR  [x] (39889) Provided verbal/tactile cueing for activities related to strengthening, flexibility, endurance, ROM  for improvements in scapular, scapulothoracic and UE control with self care, reaching, carrying, lifting, house/yardwork, driving/computer work.    [] (63349) Provided verbal/tactile cueing for activities related to improving balance, coordination, kinesthetic sense, posture, motor skill, proprioception  to assist with  scapular, scapulothoracic and UE control with self care, reaching, carrying, lifting, house/yardwork, driving/computer work.     Therapeutic Activities:    [] (07434 or 94600) Provided verbal/tactile cueing for activities related to improving balance, coordination, kinesthetic sense, posture, motor skill, proprioception and motor activation to allow for proper function of scapular, scapulothoracic and UE control with self care, carrying, lifting, prior level of function. 2. Patient will demonstrate increased AROM to WNL for L shoulder to allow for proper joint functioning as indicated by patients Functional Deficits. MET   3. Patient will demonstrate an increase in Strength to 5/5 for L shoulder to allow for proper functional mobility as indicated by patients Functional Deficits. 4. Patient will return to reaching overhead without increased symptoms or restriction. MET   5. Patient will be able to run for 1 mile without increased symptoms or restriction in L shoulder. MET    Progression Towards Functional goals:  [] Patient is progressing as expected towards functional goals listed. [] Progression is slowed due to complexities listed. [] Progression has been slowed due to co-morbidities. [x] Plan just implemented, too soon to assess goals progression  [] Other:     ASSESSMENT:   Continued increase in UE workout with emphasis on Oh and D2 patterns with theraband etc. Pt denied pain but had increased fatigue with program content. Excellent shoulder ROM and proprioception demonstrated today   Treatment/Activity Tolerance:  [x] Patient tolerated treatment well [x] Patient limited by fatique  [] Patient limited by pain  [] Patient limited by other medical complications  [] Other:     Prognosis: [x] Good [] Fair  [] Poor    Patient Requires Follow-up: [x] Yes  [] No    PLAN: Progress strength/ROM as able. Ok to start doing more at the gym to progress strength.    1x week next 2 weeks for PT session and increase intensity with gym workouts and IR stretching  [x] Continue per plan of care [] Alter current plan (see comments)  [] Plan of care initiated [] Hold pending MD visit [] Discharge    Electronically signed by: Melba Jarrell PTA, [id number]

## 2018-11-20 ENCOUNTER — HOSPITAL ENCOUNTER (OUTPATIENT)
Dept: PHYSICAL THERAPY | Age: 38
Setting detail: THERAPIES SERIES
Discharge: HOME | End: 2018-11-20
Payer: COMMERCIAL

## 2018-11-20 PROCEDURE — 97112 NEUROMUSCULAR REEDUCATION: CPT | Performed by: SPECIALIST/TECHNOLOGIST

## 2018-11-20 PROCEDURE — 97110 THERAPEUTIC EXERCISES: CPT | Performed by: SPECIALIST/TECHNOLOGIST

## 2018-11-20 NOTE — FLOWSHEET NOTE
[facility]    Physical Therapy Daily Treatment Note  Date:  2018    Patient Name:  Marie Clement    :  1980  MRN: [id number]  Restrictions/Precautions:    Medical/Treatment Diagnosis Information:  · Diagnosis: Osteoarthritis of left shoulder (M19.012), impingement syndrome of left shoulder (M75.42); s/p L shoulder arthroscopy, SAD, DCE 18  · Treatment Diagnosis: Left shoulder pain   Insurance/Certification information:  PT Insurance Information: [place] - $500 deductible, $2500 OOP max, $0 co-pay, 80%/20% co-insurance, 60 PT visits per calendar year  Physician Information:  Referring Practitioner: Dr. Angel Luis Gates of care signed (Y/N):     Date of Patient follow up with Physician: Portia Hernandez     G-Code (if applicable):      Date G-Code Applied:         Progress Note: [x]  Yes  []  No  Next due by: Visit #10      Latex Allergy:  [x]NO      []YES  Preferred Language for Healthcare:   [x]English       []other:    Visit # Insurance Allowable   10 60     Pain level:  0/10   SUBJECTIVE:  Reports having some anterior shoulder/ top of the shoulder general discomfort when it is kept in the same place for a period of time. Jogging/walking the turkey trot on Thursday. OBJECTIVE:    Observation:   Test measurements:    10/9/18 PROM progressing well with end range stiffness in all planes especially with IR/ER   10/11/18 +moderate  Ecchymosis across the anterior arm/bicep  10/16/18  Moderate Bscapular winging with wall pushups  10/26/18  Left shoulder PROM WNL in all planes.  Slight capsular tightness w/ IR  18     RESTRICTIONS/PRECAUTIONS: L shoulder arthroscopy, SAD, DCE 18    Exercises/Interventions:  45'  Therapeutic Ex Wt / Resistance Sets/sec Reps Notes      6'    Cane AAROM flex   bench press   20#    3   10x         T- band Row/EXT  High Rows           T- band ER activation  IR    standing flexion   Blue

## 2018-11-23 ENCOUNTER — HOSPITAL ENCOUNTER (OUTPATIENT)
Dept: PHYSICAL THERAPY | Age: 38
Setting detail: THERAPIES SERIES
Discharge: HOME | End: 2018-11-23
Payer: COMMERCIAL

## 2018-11-23 PROCEDURE — 97112 NEUROMUSCULAR REEDUCATION: CPT | Performed by: SPECIALIST/TECHNOLOGIST

## 2018-11-23 PROCEDURE — 97110 THERAPEUTIC EXERCISES: CPT | Performed by: SPECIALIST/TECHNOLOGIST

## 2018-11-27 ENCOUNTER — HOSPITAL ENCOUNTER (OUTPATIENT)
Dept: PHYSICAL THERAPY | Age: 38
Setting detail: THERAPIES SERIES
Discharge: HOME | End: 2018-11-27
Payer: COMMERCIAL

## 2018-11-27 PROCEDURE — 97110 THERAPEUTIC EXERCISES: CPT | Performed by: SPECIALIST/TECHNOLOGIST

## 2018-11-27 PROCEDURE — 97112 NEUROMUSCULAR REEDUCATION: CPT | Performed by: SPECIALIST/TECHNOLOGIST

## 2018-11-27 NOTE — FLOWSHEET NOTE
function of scapular, scapulothoracic and UE control with self care, carrying, lifting, driving/computer work. Home Exercise Program:    [x] (65922) Reviewed/Progressed HEP activities related to strengthening, flexibility, endurance, ROM of scapular, scapulothoracic and UE control with self care, reaching, carrying, lifting, house/yardwork, driving/computer work  [] (69879) Reviewed/Progressed HEP activities related to improving balance, coordination, kinesthetic sense, posture, motor skill, proprioception of scapular, scapulothoracic and UE control with self care, reaching, carrying, lifting, house/yardwork, driving/computer work      Manual Treatments:  PROM / STM / Oscillations-Mobs:  G-I, II, III, IV (PA's, Inf., Post.)  [x] (42141) Provided manual therapy to mobilize soft tissue/joints of cervical/CT, scapular GHJ and UE for the purpose of modulating pain, promoting relaxation,  increasing ROM, reducing/eliminating soft tissue swelling/inflammation/restriction, improving soft tissue extensibility and allowing for proper ROM for normal function with self care, reaching, carrying, lifting, house/yardwork, driving/computer work    Modalities:  Declined ice (pt will ice at home)    Charges:  Timed Code Treatment Minutes: 75   Total Treatment Minutes: 75     [] EVAL  [x] AH(56021) x  2   [] IONTO  [x] NMR (92401) x  2   [] VASO  [] Manual (95202) x       [] Other:  [] TA x       [] Mech Traction (39245)  [] ES(attended) (40564)      [] ES (un) (92826):     GOALS:  Patient stated goal: \"To get back into lifting and to get full range of motion in left shoulder\"      Therapist goals for Patient:   Short Term Goals: To be achieved in: 2 weeks  1. Independent in HEP and progression per patient tolerance, in order to prevent re-injury. MET  2. Patient will have a decrease in pain to facilitate improvement in movement, function, and ADLs as indicated by Functional Deficits. MET     Long Term Goals:  To be achieved in:

## 2018-12-04 ENCOUNTER — HOSPITAL ENCOUNTER (OUTPATIENT)
Dept: PHYSICAL THERAPY | Age: 38
Setting detail: THERAPIES SERIES
Discharge: HOME | End: 2018-12-04
Payer: COMMERCIAL

## 2018-12-04 PROCEDURE — 97110 THERAPEUTIC EXERCISES: CPT | Performed by: SPECIALIST/TECHNOLOGIST

## 2018-12-04 PROCEDURE — 97112 NEUROMUSCULAR REEDUCATION: CPT | Performed by: SPECIALIST/TECHNOLOGIST

## 2018-12-04 NOTE — FLOWSHEET NOTE
[facility]    Physical Therapy Daily Treatment Note  Date:  2018    Patient Name:  Nayan Villarreal    :  1980  MRN: [id number]  Restrictions/Precautions:    Medical/Treatment Diagnosis Information:  · Diagnosis: Osteoarthritis of left shoulder (M19.012), impingement syndrome of left shoulder (M75.42); s/p L shoulder arthroscopy, SAD, DCE 18  · Treatment Diagnosis: Left shoulder pain   Insurance/Certification information:  PT Insurance Information: [place] - $500 deductible, $2500 OOP max, $0 co-pay, 80%/20% co-insurance, 60 PT visits per calendar year  Physician Information:  Referring Practitioner: Dr. Jg Dailey of care signed (Y/N):     Date of Patient follow up with Physician: Tanesha Valverde     G-Code (if applicable):      Date G-Code Applied:         Progress Note: [x]  Yes  []  No  Next due by: Visit #10      Latex Allergy:  [x]NO      []YES  Preferred Language for Healthcare:   [x]English       []other:    Visit # Insurance Allowable   13 60     Pain level:  0/10   SUBJECTIVE:   Pt reports having no pain after last session and admits to being stronger when attempting to move boxes etc.    OBJECTIVE:    Observation:   Test measurements:    10/9/18 PROM progressing well with end range stiffness in all planes especially with IR/ER   10/11/18 +moderate  Ecchymosis across the anterior arm/bicep  10/16/18  Moderate Bscapular winging with wall pushups  10/26/18  Left shoulder PROM WNL in all planes.  Slight capsular tightness w/ IR  18     RESTRICTIONS/PRECAUTIONS: L shoulder arthroscopy, SAD, DCE 18    Exercises/Interventions:  55'  Therapeutic Ex Wt / Resistance Sets/sec Reps Notes      6'    Cane AAROM flex   bench press   20#    3   15x         T- band Row/EXT  High Rows           T- band ER activation  IR               3   10xea    Supine SA punch 10x    SL ER/ SL punch  SL ABDuction 10x CC instead   Prone function, and ADLs as indicated by Functional Deficits. MET     Long Term Goals: To be achieved in: 6-8 weeks  1. Disability index score of 20% or less for the DASH to assist with reaching prior level of function. 2. Patient will demonstrate increased AROM to WNL for L shoulder to allow for proper joint functioning as indicated by patients Functional Deficits. MET   3. Patient will demonstrate an increase in Strength to 5/5 for L shoulder to allow for proper functional mobility as indicated by patients Functional Deficits. 4. Patient will return to reaching overhead without increased symptoms or restriction. MET   5. Patient will be able to run for 1 mile without increased symptoms or restriction in L shoulder. MET    Progression Towards Functional goals:  [] Patient is progressing as expected towards functional goals listed. [] Progression is slowed due to complexities listed. [] Progression has been slowed due to co-morbidities. [x] Plan just implemented, too soon to assess goals progression  [] Other:     ASSESSMENT:   Continued increase in UE workout with emphasis on Oh and D2 patterns with theraband etc. Pt denied pain but had increased fatigue with program content. Excellent shoulder ROM and proprioception demonstrated today but fatigued quickly with prone progressions especially with SB ity's and SB plyometrics/ rhythmic stabs. Increased focus on OH with 90/90 etc which increased overall fatigue levels. Treatment/Activity Tolerance:  [x] Patient tolerated treatment well [x] Patient limited by fatique  [] Patient limited by pain  [] Patient limited by other medical complications  [] Other:     Prognosis: [x] Good [] Fair  [] Poor    Patient Requires Follow-up: [x] Yes  [] No    PLAN: Progress strength/ROM as able. Ok to start doing more at the gym to progress strength.    1x-2x week next 2 weeks for PT session and increase intensity with gym workouts and IR stretching  [x] Continue per plan of

## 2018-12-06 ENCOUNTER — HOSPITAL ENCOUNTER (OUTPATIENT)
Dept: PHYSICAL THERAPY | Age: 38
Setting detail: THERAPIES SERIES
Discharge: HOME | End: 2018-12-06
Payer: COMMERCIAL

## 2018-12-07 ENCOUNTER — HOSPITAL ENCOUNTER (OUTPATIENT)
Dept: PHYSICAL THERAPY | Age: 38
Setting detail: THERAPIES SERIES
Discharge: HOME | End: 2018-12-07
Payer: COMMERCIAL

## 2018-12-07 PROCEDURE — 97112 NEUROMUSCULAR REEDUCATION: CPT | Performed by: SPECIALIST/TECHNOLOGIST

## 2018-12-07 PROCEDURE — 97110 THERAPEUTIC EXERCISES: CPT | Performed by: SPECIALIST/TECHNOLOGIST

## 2018-12-07 NOTE — FLOWSHEET NOTE
[facility]    Physical Therapy Daily Treatment Note  Date:  2018    Patient Name:  Isaias Santana    :  1980  MRN: [id number]  Restrictions/Precautions:    Medical/Treatment Diagnosis Information:  · Diagnosis: Osteoarthritis of left shoulder (M19.012), impingement syndrome of left shoulder (M75.42); s/p L shoulder arthroscopy, SAD, DCE 18  · Treatment Diagnosis: Left shoulder pain   Insurance/Certification information:  PT Insurance Information: [place] - $500 deductible, $2500 OOP max, $0 co-pay, 80%/20% co-insurance, 61 PT visits per calendar year  Physician Information:  Referring Practitioner: Dr. Perri Phalen of care signed (Y/N):     Date of Patient follow up with Physician: Daquan Barragan     G-Code (if applicable):      Date G-Code Applied:         Progress Note: [x]  Yes  []  No  Next due by: Visit #10      Latex Allergy:  [x]NO      []YES  Preferred Language for Healthcare:   [x]English       []other:    Visit # Insurance Allowable   13 60     Pain level:  0/10   SUBJECTIVE:   Shoulder doing well, denies any issues. Doing pretty well. OBJECTIVE:    Observation:   Test measurements:    10/9/18 PROM progressing well with end range stiffness in all planes especially with IR/ER   10/11/18 +moderate  Ecchymosis across the anterior arm/bicep  10/16/18  Moderate Bscapular winging with wall pushups  10/26/18  Left shoulder PROM WNL in all planes.  Slight capsular tightness w/ IR  18     RESTRICTIONS/PRECAUTIONS: L shoulder arthroscopy, SAD, DCE 18    Exercises/Interventions:  55'  Therapeutic Ex Wt / Resistance Sets/sec Reps Notes      6'    Cane AAROM flex   bench press   20#    3   15x         T- band Row/EXT  High Rows           T- band ER activation  IR               3   10xea    Supine SA punch 10x    SL ER/ SL punch  SL ABDuction 10x CC instead   Prone Rows/EXT  HAB/ scaption    Prone 90/90      3 10xea kinesthetic sense, posture, motor skill, proprioception and motor activation to allow for proper function of scapular, scapulothoracic and UE control with self care, carrying, lifting, driving/computer work. Home Exercise Program:    [x] (01844) Reviewed/Progressed HEP activities related to strengthening, flexibility, endurance, ROM of scapular, scapulothoracic and UE control with self care, reaching, carrying, lifting, house/yardwork, driving/computer work  [] (64168) Reviewed/Progressed HEP activities related to improving balance, coordination, kinesthetic sense, posture, motor skill, proprioception of scapular, scapulothoracic and UE control with self care, reaching, carrying, lifting, house/yardwork, driving/computer work      Manual Treatments:  PROM / STM / Oscillations-Mobs:  G-I, II, III, IV (PA's, Inf., Post.)  [x] (54800) Provided manual therapy to mobilize soft tissue/joints of cervical/CT, scapular GHJ and UE for the purpose of modulating pain, promoting relaxation,  increasing ROM, reducing/eliminating soft tissue swelling/inflammation/restriction, improving soft tissue extensibility and allowing for proper ROM for normal function with self care, reaching, carrying, lifting, house/yardwork, driving/computer work    Modalities:  Declined ice (pt will ice at home)    Charges:  Timed Code Treatment Minutes: 75   Total Treatment Minutes: 75     [] EVAL  [x] LW(26049) x  2   [] IONTO  [x] NMR (66837) x  2   [] VASO  [] Manual (65840) x       [] Other:  [] TA x       [] Mech Traction (09163)  [] ES(attended) (57082)      [] ES (un) (06970):     GOALS:  Patient stated goal: \"To get back into lifting and to get full range of motion in left shoulder\"      Therapist goals for Patient:   Short Term Goals: To be achieved in: 2 weeks  1. Independent in HEP and progression per patient tolerance, in order to prevent re-injury. MET  2.  Patient will have a decrease in pain to facilitate improvement in movement,

## 2018-12-13 ENCOUNTER — HOSPITAL ENCOUNTER (OUTPATIENT)
Dept: PHYSICAL THERAPY | Age: 38
Setting detail: THERAPIES SERIES
Discharge: HOME | End: 2018-12-13
Payer: COMMERCIAL

## 2018-12-13 PROCEDURE — 97112 NEUROMUSCULAR REEDUCATION: CPT | Performed by: SPECIALIST/TECHNOLOGIST

## 2018-12-13 PROCEDURE — 97110 THERAPEUTIC EXERCISES: CPT | Performed by: SPECIALIST/TECHNOLOGIST

## 2018-12-13 NOTE — FLOWSHEET NOTE
[facility]    Physical Therapy Daily Treatment Note  Date:  2018    Patient Name:  Manny Chisholm    :  1980  MRN: [id number]  Restrictions/Precautions:    Medical/Treatment Diagnosis Information:  · Diagnosis: Osteoarthritis of left shoulder (M19.012), impingement syndrome of left shoulder (M75.42); s/p L shoulder arthroscopy, SAD, DCE 18  · Treatment Diagnosis: Left shoulder pain   Insurance/Certification information:  PT Insurance Information: [place] - $500 deductible, $2500 OOP max, $0 co-pay, 80%/20% co-insurance, 60 PT visits per calendar year  Physician Information:  Referring Practitioner: Dr. Eduard Pederson of care signed (Y/N):     Date of Patient follow up with Physician: Shana Chu     G-Code (if applicable):      Date G-Code Applied:         Progress Note: [x]  Yes  []  No  Next due by: Visit #10      Latex Allergy:  [x]NO      []YES  Preferred Language for Healthcare:   [x]English       []other:    Visit # Insurance Allowable   14 60     Pain level:  0/10   SUBJECTIVE:  Shoulder doing well, feels like his strength is doing much better especially in [place] planes. OBJECTIVE:   Observation:   Test measurements:    10/9/18 PROM progressing well with end range stiffness in all planes especially with IR/ER   10/11/18 +moderate  Ecchymosis across the anterior arm/bicep  10/16/18  Moderate Bscapular winging with wall pushups  10/26/18  Left shoulder PROM WNL in all planes.  Slight capsular tightness w/ IR     RESTRICTIONS/PRECAUTIONS: L shoulder arthroscopy, SAD, DCE 18    Exercises/Interventions:  55'  Therapeutic Ex Wt / Resistance Sets/sec Reps Notes      6'    Cane AAROM flex   bench press   20#    3   15x         T- band Row/EXT  High Rows           T- band ER activation  IR               3   10xea    Supine SA punch 10x    SL ER/ SL punch  SL ABDuction 10x CC instead   Prone Rows/EXT  HAB/ at the gym to progress strength.    1x-2x week next 2 weeks for PT session and increase intensity with gym workouts and IR stretching  [x] Continue per plan of care [] Alter current plan (see comments)  [] Plan of care initiated [] Hold pending MD visit [] Discharge    Electronically signed by: Carlyn Nicholson PTA, [id number]

## 2018-12-20 ENCOUNTER — APPOINTMENT (OUTPATIENT)
Dept: PHYSICAL THERAPY | Age: 38
End: 2018-12-20
Payer: COMMERCIAL

## 2018-12-26 ENCOUNTER — APPOINTMENT (OUTPATIENT)
Dept: PHYSICAL THERAPY | Age: 38
End: 2018-12-26
Payer: COMMERCIAL

## 2018-12-28 ENCOUNTER — APPOINTMENT (OUTPATIENT)
Dept: PHYSICAL THERAPY | Age: 38
End: 2018-12-28
Payer: COMMERCIAL

## 2019-05-15 ENCOUNTER — OFFICE VISIT (OUTPATIENT)
Dept: ORTHOPEDIC SURGERY | Age: 39
End: 2019-05-15
Payer: COMMERCIAL

## 2019-05-15 VITALS — BODY MASS INDEX: 32.99 KG/M2 | HEIGHT: 74 IN | WEIGHT: 257.06 LBS | RESPIRATION RATE: 15 BRPM

## 2019-05-15 DIAGNOSIS — M75.42 IMPINGEMENT SYNDROME OF LEFT SHOULDER: Primary | ICD-10-CM

## 2019-05-15 DIAGNOSIS — M19.019 AC JOINT ARTHROPATHY: ICD-10-CM

## 2019-05-15 PROCEDURE — 99213 OFFICE O/P EST LOW 20 MIN: CPT | Performed by: ORTHOPAEDIC SURGERY

## 2019-05-15 RX ORDER — MELOXICAM 15 MG/1
15 TABLET ORAL DAILY
Qty: 30 TABLET | Refills: 3 | Status: SHIPPED | OUTPATIENT
Start: 2019-05-15

## 2019-05-15 NOTE — PROGRESS NOTES
Comments:       Additional Examinations:         Right Upper Extremity:  Examination of the right upper extremity does not show any tenderness, deformity or injury. Range of motion is unremarkable. There is no gross instability. There are no rashes, ulcerations or lesions. Strength and tone are normal.        Assessment :  Status post left shoulder arthroscopy with subacromial decompression and distal clavicle excision    Impression:  Encounter Diagnoses   Name Primary?  Impingement syndrome of left shoulder Yes    AC joint arthropathy        Office Procedures:  No orders of the defined types were placed in this encounter. Treatment Plan:  I think he is still getting some pain along his deltoid from a subacromial decompression. I did reassure him that it's okay for him to continue to progress with weight lifting as his symptoms allow. I will also give him a prescription for meloxicam 15 mg he can take as needed on days when he is sore over his competed very busy with shoulder.   I'll see him back if continue to have problems over the next 3-4 months

## (undated) DEVICE — BOWL 32OZ-LF: Brand: MEDLINE INDUSTRIES, INC.

## (undated) DEVICE — BLADE CLIPPER GEN PURP NS

## (undated) DEVICE — LOTION PREP REMV 5OZ IODO CLR TINC OF BENZ DURAPREP

## (undated) DEVICE — T-MAX DISPOSABLE FACE MASK 8 PER BOX

## (undated) DEVICE — GLOVE SURG SZ 85 L12IN THK104MIL CRM LTX SMOOTH PWD ST

## (undated) DEVICE — GAUZE,SPONGE,4"X4",8PLY,STRL,LF,10/TRAY: Brand: MEDLINE

## (undated) DEVICE — STRIP SKIN CLSR W1XL5IN NYL REINF CURAD

## (undated) DEVICE — SLING & SWATHE: Brand: DEROYAL

## (undated) DEVICE — SYRINGE MED 50ML LUERLOCK TIP

## (undated) DEVICE — STERILE POLYISOPRENE POWDER-FREE SURGICAL GLOVES: Brand: PROTEXIS

## (undated) DEVICE — SUTURE ETHLN SZ 4-0 L18IN NONABSORBABLE BLK L19MM PS-2 3/8 1667H

## (undated) DEVICE — 3M™ STERI-DRAPE™ U-DRAPE 1015: Brand: STERI-DRAPE™

## (undated) DEVICE — HYPODERMIC SAFETY NEEDLE: Brand: MAGELLAN

## (undated) DEVICE — PACK PROCEDURE SURG SHLDR MFFOP CUST

## (undated) DEVICE — SHOULDER STABILIZATION KIT,                                    DISPOSABLE 12 PER BOX

## (undated) DEVICE — 5.5 MM ELITE ACROMIOBLASTER                                    STRAIGHT DISPOSABLE BURRS, BRICK                                    RED, 10000 MAXIMUM RPM, PACKAGED 6                                    PER BOX, STERILE

## (undated) DEVICE — INCISOR PLUS PLATINUM 4.5 MM BLADE: Brand: DYONICS INCISOR

## (undated) DEVICE — CONTROL SYRINGE LUER-LOCK TIP: Brand: MONOJECT